# Patient Record
Sex: MALE | Race: WHITE | Employment: FULL TIME | URBAN - METROPOLITAN AREA
[De-identification: names, ages, dates, MRNs, and addresses within clinical notes are randomized per-mention and may not be internally consistent; named-entity substitution may affect disease eponyms.]

---

## 2017-06-09 ENCOUNTER — OFFICE VISIT (OUTPATIENT)
Dept: ENDOCRINOLOGY | Age: 32
End: 2017-06-09

## 2017-06-09 VITALS
WEIGHT: 289.2 LBS | HEIGHT: 74 IN | HEART RATE: 68 BPM | DIASTOLIC BLOOD PRESSURE: 60 MMHG | SYSTOLIC BLOOD PRESSURE: 123 MMHG | BODY MASS INDEX: 37.12 KG/M2

## 2017-06-09 DIAGNOSIS — E29.1 HYPOGONADISM IN MALE: ICD-10-CM

## 2017-06-09 DIAGNOSIS — E55.9 VITAMIN D DEFICIENCY: ICD-10-CM

## 2017-06-09 DIAGNOSIS — R94.6 BORDERLINE ABNORMAL THYROID FUNCTION TEST: ICD-10-CM

## 2017-06-09 DIAGNOSIS — E78.1 HIGH TRIGLYCERIDES: ICD-10-CM

## 2017-06-09 DIAGNOSIS — R73.01 IMPAIRED FASTING GLUCOSE: Primary | ICD-10-CM

## 2017-06-09 RX ORDER — AMOXICILLIN AND CLAVULANATE POTASSIUM 500; 125 MG/1; MG/1
1 TABLET, FILM COATED ORAL EVERY 12 HOURS
COMMUNITY
Start: 2017-06-06 | End: 2017-06-23

## 2017-06-09 RX ORDER — OMEPRAZOLE 20 MG/1
20 CAPSULE, DELAYED RELEASE ORAL DAILY
COMMUNITY

## 2017-06-09 RX ORDER — ERGOCALCIFEROL 1.25 MG/1
CAPSULE ORAL
Refills: 5 | COMMUNITY
Start: 2017-05-12 | End: 2017-06-22 | Stop reason: SDUPTHER

## 2017-06-09 NOTE — MR AVS SNAPSHOT
Visit Information Date & Time Provider Department Dept. Phone Encounter #  
 6/9/2017  8:50 AM Reggie Triplett, [address] [place] [address] Diabetes and Endocrinology  Follow-up Instructions Return in about 1 year (around 6/9/2018). Upcoming Health Maintenance Date Due DTaP/Tdap/Td series (1 - Tdap) 7/22/2007 INFLUENZA AGE 9 TO ADULT 8/1/2017 Allergies as of 6/9/2017  Review Complete On: 6/9/2017 By: Reggie Triplett MD  
 No Known Allergies Current Immunizations  Never Reviewed No immunizations on file. Not reviewed this visit You Were Diagnosed With   
  
 Codes Comments Impaired fasting glucose    -  Primary ICD-10-CM: R73.01 
ICD-9-CM: 790.21 Hypogonadism in male     ICD-10-CM: E29.1 ICD-9-CM: 257.2 Vitamin D deficiency     ICD-10-CM: E55.9 ICD-9-CM: 268.9 High triglycerides     ICD-10-CM: E78.1 ICD-9-CM: 272.1 Borderline abnormal thyroid function test     ICD-10-CM: R94.6 ICD-9-CM: 794.5 Vitals BP Pulse Height(growth percentile) Weight(growth percentile) BMI Smoking Status 123/60 (BP 1 Location: Right arm) 68 6' 2\" (1.88 m) 289 lb 3.2 oz (131.2 kg) 37.13 kg/m2 Former Smoker Vitals History BMI and BSA Data Body Mass Index Body Surface Area  
 37.13 kg/m 2 2.62 m 2 Preferred Pharmacy Pharmacy Name Phone [id number] [place] [phone] Your Updated Medication List  
  
   
This list is accurate as of: 6/9/17  9:50 AM.  Always use your most recent med list.  
  
  
  
  
 amoxicillin-clavulanate 500-125 mg per tablet Commonly known as:  AUGMENTIN Take 1 Tab by mouth every twelve (12) hours. omeprazole 20 mg capsule Commonly known as:  PRILOSEC Take 20 mg by mouth daily. VITAMIN D2 50,000 unit capsule Generic drug:  ergocalciferol TAKE ONE CAPSULE BY MOUTH EVERY WEEK We Performed the Following HEMOGLOBIN A1C WITH EAG [10107 CPT(R)] LIPID PANEL [12317 CPT(R)] METABOLIC PANEL, COMPREHENSIVE [74529 CPT(R)] TN COLLECTION VENOUS BLOOD,VENIPUNCTURE [id number] CPT(R)] TN HANDLG&/OR CONVEY OF SPEC FOR TR OFFICE TO LAB [48559 CPT(R)] PROLACTIN [85178 CPT(R)] T4, FREE [id number] CPT(R)] TESTOSTERONE, TOTAL, ADULT MALE [69296 CPT(R)] THYROID PEROXIDASE (TPO) AB [02214 CPT(R)] TSH 3RD GENERATION [05777 CPT(R)] VITAMIN D, 25 HYDROXY [id number] CPT(R)] Follow-up Instructions Return in about 1 year (around 6/9/2018). Patient Instructions 1) You have lost 17 lbs since Oct 2016. Keep up the good work. 2) We will repeat your testosterone and your Hemoglobin A1c (3 month test of blood sugar) and cholesterol and thyroid and vitamin D levels to see the effects of weight loss on your labs. 3) I will send you a message through [employer] with your lab results but if you don't sign up, we'll call you or send you a letter with the results. Introducing [facility]! [employer] introduces [employer] patient portal. Now you can access parts of your medical record, email your doctor's office, and request medication refills online. 1. In your internet browser, go to [email or web address] 2. Click on the First Time User? Click Here link in the Sign In box. You will see the New Member Sign Up page. 3. Enter your [employer] Access Code exactly as it appears below. You will not need to use this code after youve completed the sign-up process. If you do not sign up before the expiration date, you must request a new code. · [employer] Access Code: [email or web address] Expires: 9/7/2017  9:50 AM 
 
4. Enter the last four digits of your Social Security Number (xxxx) and Date of Birth (mm/dd/yyyy) as indicated and click Submit. You will be taken to the next sign-up page. 5. Create a MyChart ID.  This will be your [employer] login ID and cannot be changed, so think of one that is secure and easy to remember. 6. Create a [employer] password. You can change your password at any time. 7. Enter your Password Reset Question and Answer. This can be used at a later time if you forget your password. 8. Enter your e-mail address. You will receive e-mail notification when new information is available in [address] 9. Click Sign Up. You can now view and download portions of your medical record. 10. Click the Download Summary menu link to download a portable copy of your medical information. If you have questions, please visit the Frequently Asked Questions section of the [email or web address] website. Remember, [employer] is NOT to be used for urgent needs. For medical emergencies, dial 911. Now available from your iPhone and Android! Please provide this summary of care documentation to your next provider. Your primary care clinician is listed as Ritika Kenny. If you have any questions after today's visit, please call [phone].

## 2017-06-09 NOTE — PROGRESS NOTES
Chief Complaint   Patient presents with    Other     metabolic syndrome    Other     pcp and pharmacy confirmed     History of Present Illness: Latasha Bishop (goes by Lito Mosley) is a 28 y.o. male who is a new patient for evaluation of metabolic syndrome. He went for a physical in Oct 2016 and was found to have a fasting sugar of 126 and an A1c of 5.8%. No FH of DM that he knows of. A typical day is as follows:  - breakfast: tends to skip but may have a sausage biscuit on the go  - lunch: tends to grab a burger and fang on the go  - dinner: tends to also eat out, has cut way back on candy since 10/16  - beverages: may have a 16-20 oz bottle of reg soda 2-3 times a week, rarely may have 1/2 tea 1/2 lemonade  - snacks: none  Exercise consists of walking on the job but no other dedicated exercise. No history of vascular disease. No history of retinopathy, neuropathy, or nephropathy. Last eye exam was 5-6 years ago. As part of the evaluation in 10/16 was also found to have a low testosterone of 140 with a low free T of 6.3 and a normal LH/FSH of 6.8/3.9. Has noticed a decrease in sex drive but not in function. No breast tenderness or decreased facial or body hair growth. Some fatigue. No muscle weakness. Has a TSH that was slightly high at 4.58 in 10/17. Did have a bout of constipation a few weeks ago but has been better recently. No dry skin. No cold intolerance. Also was found to have elevated TGs of 405 and low HDL of 32. Does drink beer during the week as listed in social history. Will be moving to [place] at the end of the month but is willing to come back once a year to be seen. Currently taking vitamin D 50K units weekly for a level of 20.2 in 10/16.     Past Medical History:   Diagnosis Date    Borderline abnormal thyroid function test     TSH 4.56 in 10/17    GERD (gastroesophageal reflux disease)     Gout     affected both feet    High triglycerides     Hypogonadism in male    Willem Becerril Impaired fasting glucose     Vitamin D deficiency      Past Surgical History:   Procedure Laterality Date    HX WISDOM TEETH EXTRACTION       Current Outpatient Prescriptions   Medication Sig    amoxicillin-clavulanate (AUGMENTIN) 500-125 mg per tablet Take 1 Tab by mouth every twelve (12) hours.  VITAMIN D2 50,000 unit capsule TAKE ONE CAPSULE BY MOUTH EVERY WEEK    omeprazole (PRILOSEC) 20 mg capsule Take 20 mg by mouth daily. No current facility-administered medications for this visit. No Known Allergies     Family History   Problem Relation Age of Onset    No Known Problems Mother     Hypertension Father     Hypertension Paternal Grandmother     Stroke Paternal Grandmother      affecting her vision    Diabetes Neg Hx      Social History     Social History    Marital status: UNKNOWN     Spouse name: N/A    Number of children: N/A    Years of education: N/A     Occupational History    Not on file. Social History Main Topics    Smoking status: Former Smoker     Quit date: 6/9/2006    Smokeless tobacco: Not on file      Comment: only smoked 2-3 cigs/day    Alcohol use Yes      Comment: may have 6 beers 2-3 times a week    Drug use: No    Sexual activity: Not on file     Other Topics Concern    Not on file     Social History Narrative    Lives in [facility] with his grandmother. Will be moving out soon down to [place]. He is engaged and will be getting  in Jan 2018. Works inspecting fire systems.        Review of Systems:  - Constitutional Symptoms: no fevers, chills, (+) 17 lb weight loss since 10/16  - Eyes: no blurry vision or double vision  - Cardiovascular: no chest pain or palpitations  - Respiratory: some cough and is currently on augmentin for this, no shortness of breath  - Gastrointestinal: no dysphagia or abdominal pain  - Musculoskeletal: no joint pains or weakness  - Integumentary: no rashes  - Neurological: no numbness, tingling, or headaches  - Psychiatric: no depression, some anxiety regarding upcoming wedding  - Endocrine: mild heat intolerance, no polyuria or polydipsia    Physical Examination:  Blood pressure 123/60, pulse 68, height 6' 2\" (1.88 m), weight 289 lb 3.2 oz (131.2 kg). - General: pleasant, no distress, good eye contact  - HEENT: no exopthalmos, no periorbital edema, no scleral/conjunctival injection, EOMI, no lid lag or stare  - Neck: supple, no thyromegaly, masses, lymph nodes, or carotid bruits, no supraclavicular or dorsocervical fat pads  - Cardiovascular: regular, normal rate, normal S1 and S2, no murmurs/rubs/gallops,  - Respiratory: clear to auscultation bilaterally  - Gastrointestinal: soft, nontender, nondistended, no masses, no hepatosplenomegaly  - Musculoskeletal: no proximal muscle weakness in upper or lower extremities  - Integumentary: no acanthosis nigricans, no abdominal striae, no rashes, no edema,   - Neurological: reflexes 2+, no tremors  - Psychiatric: normal mood and affect        Data Reviewed: 10/28/16  - Hgb A1c 5.8%  - lipids: total 188 ,  HDL 32, , LDL can't be calculated  - ALT 40, AST 27  - BUN/Cr 15/1.18  - TSH 4.58  - free T4 1.15  - vitamin D 20.2  - total testosterone 140  - free testosterone 6.3  - LH/FSH 6.8/3.9    Assessment/Plan:   1. Impaired fasting glucose: his most recent Hgb A1c was 5.8% and fasting sugar was 126 in 10/16. Has lost 17 lbs since then so hopefully his levels have improved. Still needs to work on diet and beer intake  - check A1c and cmp today and prior to next visit      2. Hypogonadism in male: was found to have a low testosterone of 140 with a low free T of 6.3 and a normal LH/FSH of 6.8/3.9 in 10/16. Likely due to obesity but will rule out high prolactin. - check prolactin and total testosterone level today     3.  Vitamin D deficiency: level was 20.2 in 10/16 and taking weekly vitamin D since then  - check Vitamin D 25-OH level today and prior to next visit  - cont ergo 50K units weekly      4. High triglycerides:  and HDL 32 in 10/16. Weight loss should help. - check lipids today and prior to next visit     5. Borderline abnormal thyroid function test: TSH was 4.56 and free T4 1.15 in 10/16. Will repeat and screen for hashimoto's to see if treatment is needed. - check TSH, free T4 and TPO ab today  - check TSH and free T4 prior to next visit       Patient Instructions   1) You have lost 17 lbs since Oct 2016. Keep up the good work. 2) We will repeat your testosterone and your Hemoglobin A1c (3 month test of blood sugar) and cholesterol and thyroid and vitamin D levels to see the effects of weight loss on your labs. 3) I will send you a message through [employer] with your lab results but if you don't sign up, we'll call you or send you a letter with the results. Follow-up Disposition:  Return in about 1 year (around 6/9/2018). Copy sent to:  Chi French NP    Lab follow up: 6/22/17    Sent him the following message in a letter and called him with his lab results:    Resulted Orders   TESTOSTERONE, TOTAL, ADULT MALE   Result Value Ref Range    Testosterone 149 (L) 348 - 1197 ng/dL      Comment:      **Effective July 17, 2017 the reference interval for**    Testosterone, Serum Males >25years old will be    changing to: Adult Males:      36 - 65      COMMENT Comment       Comment:      Adult male reference interval is based on a population of lean males  up to 36years old.       Narrative    Performed at:  [address]  [id number]  : Liz Donald MD, Phone:  [phone]   HEMOGLOBIN A1C WITH EAG   Result Value Ref Range    Hemoglobin A1c 5.5 4.8 - 5.6 %      Comment:               Pre-diabetes: 5.7 - 6.4           Diabetes: >6.4           Glycemic control for adults with diabetes: <7.0      Estimated average glucose 111 mg/dL    Narrative    Performed at:  [facility] [place], [place]  [id number]  : Jacqueline Campos MD, Phone:  [phone]   LIPID PANEL   Result Value Ref Range    Cholesterol, total 162 100 - 199 mg/dL    Triglyceride 433 (H) 0 - 149 mg/dL    HDL Cholesterol 25 (L) >39 mg/dL    VLDL, calculated Comment 5 - 40 mg/dL      Comment:      The calculation for the VLDL cholesterol is not valid when  triglyceride level is >400 mg/dL. LDL, calculated Comment 0 - 99 mg/dL      Comment:      Triglyceride result indicated is too high for an accurate LDL  cholesterol estimation. Narrative    Performed at:  [address]  [id number]  : Jacqueline Campos MD, Phone:  [phone]   TSH 3RD GENERATION   Result Value Ref Range    TSH 4.180 0.450 - 4.500 uIU/mL    Narrative    Performed at:  [address]  [id number]  : Jacqueline Campos MD, Phone:  [phone]   T4, FREE   Result Value Ref Range    T4, Free 1.07 0.82 - 1.77 ng/dL    Narrative    Performed at:  [address]  [id number]  : Jacqueline Campos MD, Phone:  [phone]   THYROID PEROXIDASE (TPO) AB   Result Value Ref Range    Thyroid peroxidase Ab 10 0 - 34 IU/mL    Narrative    Performed at:  [address]  [id number]  : Jacqueline Campos MD, Phone:  [phone]   VITAMIN D, 25 HYDROXY   Result Value Ref Range    VITAMIN D, 25-HYDROXY 28.1 (L) 30.0 - 100.0 ng/mL      Comment:      Vitamin D deficiency has been defined by the [place] of  Medicine and an Endocrine Society practice guideline as a  level of serum 25-OH vitamin D less than 20 ng/mL (1,2). The Endocrine Society went on to further define vitamin D  insufficiency as a level between 21 and 29 ng/mL (2). 1. IOM ([place] of Medicine). 2010. Dietary reference     intakes for calcium and D. [facility]:  The     [employer].  2. Cristina MF, Diony NC, Sindi SHAHID, et al.     Evaluation, treatment, and prevention of vitamin D     deficiency: an Endocrine Society clinical practice     guideline. JCEM. 2011 Jul; 96(7):1911-30. Narrative    Performed at:  [address]  [id number]  : Vilma Avery MD, Phone:  [phone]   PROLACTIN   Result Value Ref Range    Prolactin 16.5 (H) 4.0 - 15.2 ng/mL    Narrative    Performed at:  [address]  [id number]  : Vilma Avery MD, Phone:  [phone]   CVD REPORT   Result Value Ref Range    INTERPRETATION Note       Comment:      Medical Director's Note: A CV Risk Assessment Report was not  sent because both LDL cholesterol and non-HDL cholesterol  results are required to generate a report. A [employer] interpretive report has not been generated  since ordered tests are not currently relevant to the  program.      PDF IMAGE Not applicable     Narrative    Performed at:  [address] [place] [address] A  [address] [place] [address]  [id number]  : Oneida Quezada PhD, Phone:  [phone]       Per our phone conversation:    1) Hemoglobin A1c is a 3 month marker of your blood sugar control. Normal is less than 5.7% and diabetes is greater than 6.4%. Your Hemoglobin A1c is 5.5% which means your blood sugar is now in the normal non-diabetic range and under better control than last check when your value was 5.8%. Continue to work on your diet and exercise to keep your blood sugar in the normal range. 2) Total Cholesterol is the total number of cholesterol particles in your blood. Goal is less than 200. Your value is at goal.    Triglycerides are the short term fats in your blood. Goal is less than 150. Your value is above goal.    HDL is the good cholesterol in your blood. Goal is more than 40.   Your value is below goal.    LDL is the bad cholesterol in your blood. Goal is less than 100. Your value is unable to be calculated due to the high triglyceride level. Continue to follow a low cholesterol diet. Try to limit the amount of fried foods, fatty foods, butter, gravy, red meat, ice cream, cheese, and eggs in your diet, which are all high in cholesterol. 3) Your testosterone is still low at 149 and only up slightly from 140 in 10/16. Your prolactin level is slightly elevated at 15.9 so this may be causing your testosterone level to be low as high prolactin can prevent your pituitary gland (master hormone gland in the brain) to send the signal to your testicles to make testosterone. However, your TSH (thyroid test) is also towards the higher part of normal at 4.1 and sometimes when your TSH is high it can mean your metabolism is too slow (hypothyroidism) and this can cause your prolactin level to rise. Therefore, I think the best plan is to do a trial of thyroid hormone called levothyroxine and take one tablet daily of 137 mcg. Take Levothyroxine either in the morning with just water, at least 30 minutes before breakfast and coffee and all other pills, or take it at bedtime on any empty stomach 2-3 hours after dinner. This must be  from vitamins, calcium, or iron by at least 4 hours. [facility]  today. 4) Your vitamin D level is 28 which is slightly low but up from 20 in 10/16. Goal is over 30. Please continue to take 50,000 units of vitamin D weekly to keep your levels at goal. I [facility] refills [facility].  If you miss a dose of vitamin D, it's okay to take it as soon as you remember or just double up the next week. 5) I have enclosed a lab slip and ask that you go to your local [facility] and repeat your lab 6 weeks after starting the levothyroxine so we can see how your thyroid and testosterone and prolactin levels are doing. Please fast for your labs.   Don't eat anything after midnight and go to the lab between 7:30-9am and I will be in touch with the results. 6) Please sign up for mychart so you can be in touch with me once you move to [place].

## 2017-06-09 NOTE — PATIENT INSTRUCTIONS
1) You have lost 17 lbs since Oct 2016. Keep up the good work. 2) We will repeat your testosterone and your Hemoglobin A1c (3 month test of blood sugar) and cholesterol and thyroid and vitamin D levels to see the effects of weight loss on your labs. 3) I will send you a message through [employer] with your lab results but if you don't sign up, we'll call you or send you a letter with the results.

## 2017-06-10 LAB
25(OH)D3+25(OH)D2 SERPL-MCNC: 28.1 NG/ML (ref 30–100)
CHOLEST SERPL-MCNC: 162 MG/DL (ref 100–199)
COMMENT: ABNORMAL
EST. AVERAGE GLUCOSE BLD GHB EST-MCNC: 111 MG/DL
HBA1C MFR BLD: 5.5 % (ref 4.8–5.6)
HDLC SERPL-MCNC: 25 MG/DL
INTERPRETATION, 910389: NORMAL
LDLC SERPL CALC-MCNC: ABNORMAL MG/DL (ref 0–99)
PDF IMAGE, 910387: NORMAL
PROLACTIN SERPL-MCNC: 16.5 NG/ML (ref 4–15.2)
T4 FREE SERPL-MCNC: 1.07 NG/DL (ref 0.82–1.77)
TESTOST SERPL-MCNC: 149 NG/DL (ref 348–1197)
THYROPEROXIDASE AB SERPL-ACNC: 10 IU/ML (ref 0–34)
TRIGL SERPL-MCNC: 433 MG/DL (ref 0–149)
TSH SERPL DL<=0.005 MIU/L-ACNC: 4.18 UIU/ML (ref 0.45–4.5)
VLDLC SERPL CALC-MCNC: ABNORMAL MG/DL (ref 5–40)

## 2017-06-22 RX ORDER — LEVOTHYROXINE SODIUM 137 UG/1
137 TABLET ORAL
Qty: 30 TAB | Refills: 11 | Status: SHIPPED | OUTPATIENT
Start: 2017-06-22

## 2017-06-22 RX ORDER — ERGOCALCIFEROL 1.25 MG/1
CAPSULE ORAL
Qty: 4 CAP | Refills: 11 | Status: SHIPPED | OUTPATIENT
Start: 2017-06-22

## 2020-08-26 PROBLEM — U07.1 PNEUMONIA DUE TO COVID-19 VIRUS: Status: ACTIVE | Noted: 2020-08-26

## 2020-08-26 PROBLEM — J12.82 PNEUMONIA DUE TO COVID-19 VIRUS: Status: ACTIVE | Noted: 2020-08-26

## 2021-09-25 PROBLEM — M10.9 ACUTE GOUT OF LEFT FOOT: Status: ACTIVE | Noted: 2021-09-25

## 2021-09-25 PROBLEM — L03.116 CELLULITIS OF LEFT LOWER EXTREMITY: Status: ACTIVE | Noted: 2021-09-25

## 2021-09-25 PROBLEM — Z86.14 HISTORY OF MRSA INFECTION: Status: ACTIVE | Noted: 2021-09-25

## 2022-03-18 PROBLEM — J12.82 PNEUMONIA DUE TO COVID-19 VIRUS: Status: ACTIVE | Noted: 2020-08-26

## 2022-03-18 PROBLEM — U07.1 PNEUMONIA DUE TO COVID-19 VIRUS: Status: ACTIVE | Noted: 2020-08-26

## 2022-03-18 PROBLEM — M10.9 ACUTE GOUT OF LEFT FOOT: Status: ACTIVE | Noted: 2021-09-25

## 2022-03-19 PROBLEM — L03.116 CELLULITIS OF LEFT LOWER EXTREMITY: Status: ACTIVE | Noted: 2021-09-25

## 2022-03-19 PROBLEM — Z86.14 HISTORY OF MRSA INFECTION: Status: ACTIVE | Noted: 2021-09-25

## 2022-07-18 PROBLEM — M77.8 EXTENSOR TENDINITIS OF FOOT: Status: ACTIVE | Noted: 2022-07-18

## 2022-07-18 PROBLEM — M67.90 TENDINOPATHY: Status: ACTIVE | Noted: 2022-07-18

## 2023-01-31 RX ORDER — GUAIFENESIN 200 MG/10ML
100 LIQUID ORAL 3 TIMES DAILY PRN
COMMUNITY
Start: 2020-08-28

## 2023-01-31 RX ORDER — OMEPRAZOLE 20 MG/1
20 CAPSULE, DELAYED RELEASE ORAL DAILY
COMMUNITY

## 2023-01-31 RX ORDER — LEVOTHYROXINE SODIUM 137 UG/1
137 TABLET ORAL
COMMUNITY
Start: 2017-06-22

## 2023-01-31 RX ORDER — ERGOCALCIFEROL 1.25 MG/1
CAPSULE ORAL
COMMUNITY
Start: 2017-06-22

## 2023-11-30 ENCOUNTER — HOSPITAL ENCOUNTER (EMERGENCY)
Facility: HOSPITAL | Age: 38
Discharge: HOME | End: 2023-11-30
Attending: EMERGENCY MEDICINE
Payer: COMMERCIAL

## 2023-11-30 VITALS
RESPIRATION RATE: 20 BRPM | DIASTOLIC BLOOD PRESSURE: 96 MMHG | HEART RATE: 93 BPM | TEMPERATURE: 97.7 F | SYSTOLIC BLOOD PRESSURE: 174 MMHG | WEIGHT: 285 LBS | BODY MASS INDEX: 37.77 KG/M2 | OXYGEN SATURATION: 96 % | HEIGHT: 73 IN

## 2023-11-30 DIAGNOSIS — L94.2 CALCINOSIS CUTIS: Primary | ICD-10-CM

## 2023-11-30 DIAGNOSIS — L03.019 CELLULITIS OF LITTLE FINGER: ICD-10-CM

## 2023-11-30 PROCEDURE — 99284 EMERGENCY DEPT VISIT MOD MDM: CPT

## 2023-11-30 PROCEDURE — 96374 THER/PROPH/DIAG INJ IV PUSH: CPT

## 2023-11-30 PROCEDURE — 6360000002 HC RX W HCPCS: Performed by: EMERGENCY MEDICINE

## 2023-11-30 PROCEDURE — 2500000003 HC RX 250 WO HCPCS

## 2023-11-30 RX ORDER — COLCHICINE 0.6 MG/1
0.6 TABLET ORAL DAILY PRN
Status: ON HOLD | COMMUNITY
Start: 2023-11-09

## 2023-11-30 RX ORDER — OXYCODONE HYDROCHLORIDE AND ACETAMINOPHEN 5; 325 MG/1; MG/1
1 TABLET ORAL EVERY 6 HOURS PRN
Qty: 12 TABLET | Refills: 0 | Status: ON HOLD | OUTPATIENT
Start: 2023-11-30 | End: 2023-12-03

## 2023-11-30 RX ORDER — DOXYCYCLINE HYCLATE 100 MG
100 TABLET ORAL 2 TIMES DAILY
Qty: 14 TABLET | Refills: 0 | Status: ON HOLD | OUTPATIENT
Start: 2023-11-30 | End: 2023-12-07

## 2023-11-30 RX ORDER — MORPHINE SULFATE 4 MG/ML
4 INJECTION, SOLUTION INTRAMUSCULAR; INTRAVENOUS
Status: COMPLETED | OUTPATIENT
Start: 2023-11-30 | End: 2023-11-30

## 2023-11-30 RX ORDER — HYDROCODONE BITARTRATE AND ACETAMINOPHEN 10; 325 MG/1; MG/1
1 TABLET ORAL EVERY 6 HOURS PRN
Qty: 9 TABLET | Refills: 0 | Status: ON HOLD | OUTPATIENT
Start: 2023-11-30 | End: 2023-12-03

## 2023-11-30 RX ORDER — OXYCODONE HYDROCHLORIDE AND ACETAMINOPHEN 5; 325 MG/1; MG/1
2 TABLET ORAL
Status: DISCONTINUED | OUTPATIENT
Start: 2023-11-30 | End: 2023-11-30

## 2023-11-30 RX ORDER — LIDOCAINE HYDROCHLORIDE 10 MG/ML
5 INJECTION, SOLUTION INFILTRATION; PERINEURAL
Status: COMPLETED | OUTPATIENT
Start: 2023-11-30 | End: 2023-11-30

## 2023-11-30 RX ORDER — LIDOCAINE HYDROCHLORIDE 10 MG/ML
INJECTION, SOLUTION INFILTRATION; PERINEURAL
Status: COMPLETED
Start: 2023-11-30 | End: 2023-11-30

## 2023-11-30 RX ADMIN — LIDOCAINE HYDROCHLORIDE 5 ML: 10 INJECTION, SOLUTION INFILTRATION; PERINEURAL at 16:26

## 2023-11-30 RX ADMIN — MORPHINE SULFATE 4 MG: 4 INJECTION, SOLUTION INTRAMUSCULAR; INTRAVENOUS at 16:46

## 2023-11-30 ASSESSMENT — PAIN DESCRIPTION - DESCRIPTORS: DESCRIPTORS: THROBBING

## 2023-11-30 ASSESSMENT — PAIN DESCRIPTION - ORIENTATION: ORIENTATION: RIGHT

## 2023-11-30 ASSESSMENT — PAIN - FUNCTIONAL ASSESSMENT: PAIN_FUNCTIONAL_ASSESSMENT: 0-10

## 2023-11-30 ASSESSMENT — PAIN SCALES - GENERAL: PAINLEVEL_OUTOF10: 10

## 2023-11-30 ASSESSMENT — PAIN DESCRIPTION - LOCATION: LOCATION: FINGER (COMMENT WHICH ONE)

## 2023-11-30 NOTE — ED PROVIDER NOTES
[facility] EMERGENCY DEPT  EMERGENCY DEPARTMENT HISTORY AND PHYSICAL EXAM      Date: 2023  Patient Name: Virgilio Ellington  MRN: [id number]  [address] [facility] 1985  Date of evaluation: 2023  Provider: Jae Macias MD   Note Started: 4:02 PM EST 23    HISTORY OF PRESENT ILLNESS     Chief Complaint   Patient presents with    Abscess       History Provided By: Patient    HPI: Virgilio Ellington is a 41 y.o. male here with excruciating pain to his right pinky finger. He had known deposits at the distal aspects of his fingers including this right pinky finger. Yesterday it started becoming more swollen and painful. He had previously been told this was gout. He has a history of gout in his feet which he takes colchicine for.     PAST MEDICAL HISTORY   Past Medical History:  Past Medical History:   Diagnosis Date    Borderline abnormal thyroid function test     TSH 4.56 in 10/17    GERD (gastroesophageal reflux disease)     Gout     affected both feet    High triglycerides     Hypogonadism in male     Impaired fasting glucose     Vitamin D deficiency        Past Surgical History:  Past Surgical History:   Procedure Laterality Date    WISDOM TOOTH EXTRACTION         Family History:  Family History   Problem Relation Age of Onset    Diabetes Neg Hx     Stroke Paternal Grandmother         affecting her vision    Hypertension Paternal Grandmother     Hypertension Father     No Known Problems Mother        Social History:  Social History     Tobacco Use    Smoking status: Former     Types: Cigarettes     Quit date: 2006     Years since quittin.4    Smokeless tobacco: Never    Tobacco comments:     Quit smoking: only smoked 2-3 cigs/day   Substance Use Topics    Alcohol use: Yes    Drug use: No       Allergies:  No Known Allergies    PCP: Johnny Colón MD    Current Meds:   Current Facility-Administered Medications   Medication Dose Route Frequency Provider Last Rate Last Admin    lidocaine 1 %

## 2023-12-02 PROBLEM — M86.9 OSTEOMYELITIS (HCC): Status: ACTIVE | Noted: 2023-12-02

## 2025-05-30 ENCOUNTER — HOSPITAL ENCOUNTER (EMERGENCY)
Age: 40
Discharge: HOME | End: 2025-05-30
Attending: EMERGENCY MEDICINE
Payer: COMMERCIAL

## 2025-05-30 VITALS
OXYGEN SATURATION: 99 % | SYSTOLIC BLOOD PRESSURE: 170 MMHG | RESPIRATION RATE: 19 BRPM | DIASTOLIC BLOOD PRESSURE: 93 MMHG | BODY MASS INDEX: 35.12 KG/M2 | WEIGHT: 265 LBS | HEIGHT: 73 IN | TEMPERATURE: 98.3 F | HEART RATE: 65 BPM

## 2025-05-30 DIAGNOSIS — R07.9 CHEST PAIN, UNSPECIFIED TYPE: Primary | ICD-10-CM

## 2025-05-30 LAB
ANION GAP SERPL CALC-SCNC: 16 MMOL/L (ref 7–16)
BASOPHILS # BLD: 0.04 K/UL (ref 0–0.1)
BASOPHILS NFR BLD: 0.8 % (ref 0–2)
BUN SERPL-MCNC: 15 MG/DL (ref 6–23)
BUN/CREAT SERPL: 16
CALCIUM SERPL-MCNC: 9.6 MG/DL (ref 8.5–10.1)
CHLORIDE SERPL-SCNC: 102 MMOL/L (ref 98–107)
CO2 SERPL-SCNC: 22 MMOL/L (ref 21–32)
CREAT SERPL-MCNC: 0.93 MG/DL (ref 0.6–1.3)
DIFFERENTIAL METHOD BLD: NORMAL
EKG ATRIAL RATE: 64 BPM
EKG DIAGNOSIS: NORMAL
EKG P AXIS: 28 DEGREES
EKG P-R INTERVAL: 132 MS
EKG Q-T INTERVAL: 404 MS
EKG QRS DURATION: 100 MS
EKG QTC CALCULATION (BAZETT): 416 MS
EKG R AXIS: 15 DEGREES
EKG T AXIS: 28 DEGREES
EKG VENTRICULAR RATE: 64 BPM
EOSINOPHIL # BLD: 0.02 K/UL (ref 0–0.4)
EOSINOPHIL NFR BLD: 0.4 % (ref 0–5)
ERYTHROCYTE [DISTWIDTH] IN BLOOD BY AUTOMATED COUNT: 12.7 % (ref 11.6–14.5)
GLUCOSE SERPL-MCNC: 104 MG/DL (ref 74–108)
HCT VFR BLD AUTO: 42.2 % (ref 36–48)
HGB BLD-MCNC: 14.9 G/DL (ref 13–16)
IMM GRANULOCYTES # BLD AUTO: 0.01 K/UL (ref 0–0.04)
IMM GRANULOCYTES NFR BLD AUTO: 0.2 % (ref 0–0.5)
LYMPHOCYTES # BLD: 1.74 K/UL (ref 0.9–3.6)
LYMPHOCYTES NFR BLD: 36.1 % (ref 21–52)
MCH RBC QN AUTO: 31.2 PG (ref 24–34)
MCHC RBC AUTO-ENTMCNC: 35.3 G/DL (ref 31–37)
MCV RBC AUTO: 88.5 FL (ref 78–100)
MONOCYTES # BLD: 0.3 K/UL (ref 0.05–1.2)
MONOCYTES NFR BLD: 6.2 % (ref 3–10)
NEUTS SEG # BLD: 2.71 K/UL (ref 1.8–8)
NEUTS SEG NFR BLD: 56.3 % (ref 40–73)
NRBC # BLD: 0 K/UL (ref 0–0.01)
NRBC BLD-RTO: 0 PER 100 WBC
PLATELET # BLD AUTO: 180 K/UL (ref 135–420)
PMV BLD AUTO: 10.4 FL (ref 9.2–11.8)
POTASSIUM SERPL-SCNC: 4.1 MMOL/L (ref 3.5–5.5)
RBC # BLD AUTO: 4.77 M/UL (ref 4.35–5.65)
SODIUM SERPL-SCNC: 140 MMOL/L (ref 136–145)
TROPONIN T SERPL HS-MCNC: <6 NG/L (ref 0–22)
WBC # BLD AUTO: 4.8 K/UL (ref 4.6–13.2)

## 2025-05-30 PROCEDURE — 85025 COMPLETE CBC W/AUTO DIFF WBC: CPT

## 2025-05-30 PROCEDURE — 99284 EMERGENCY DEPT VISIT MOD MDM: CPT

## 2025-05-30 PROCEDURE — 84484 ASSAY OF TROPONIN QUANT: CPT

## 2025-05-30 PROCEDURE — 93005 ELECTROCARDIOGRAM TRACING: CPT

## 2025-05-30 PROCEDURE — 80048 BASIC METABOLIC PNL TOTAL CA: CPT

## 2025-05-30 ASSESSMENT — PAIN - FUNCTIONAL ASSESSMENT: PAIN_FUNCTIONAL_ASSESSMENT: NONE - DENIES PAIN

## 2025-05-30 ASSESSMENT — LIFESTYLE VARIABLES
HOW OFTEN DO YOU HAVE A DRINK CONTAINING ALCOHOL: 2-3 TIMES A WEEK
HOW MANY STANDARD DRINKS CONTAINING ALCOHOL DO YOU HAVE ON A TYPICAL DAY: 1 OR 2

## 2025-05-30 NOTE — ED TRIAGE NOTES
Arrived from [facility] via [facility] for c/o intermittent \"sharp stabbing pain to the center of my chest lasting a few seconds and I felt like I was going to Black Out\". Denies pain at this time. Occurred once yesterday as well. Denies SOB.

## 2025-05-30 NOTE — ED PROVIDER NOTES
EMERGENCY DEPARTMENT HISTORY AND PHYSICAL EXAM    1:50 PM EDT seen at the time in room 4        Date: 5/30/2025  Patient Name: Frank Salinas    History of Presenting Illness     Chief Complaint   Patient presents with    Chest Pain         History Provided By: patient    Additional History (Context): Frank Salinas is a 39 y.o. male presents with no contributory medical history medications or allergies had an episode of pain around noon, just like someone it stabbed him in the chest lasted for seconds but he became lightheaded and things started to go black.  Resolved spontaneously.  Today around noon had a very similar episode severe stabbing pain lasted for just a couple of seconds and again felt like he was going to pass out and this scared him and so he wants to make sure he does not have a heart attack or a blood clot no alarming family history.    PCP: Mirtha Gómez MD    Chief Complaint:   Duration:    Timing:    Location:   Quality:   Severity:   Modifying Factors:   Associated Symptoms:       No current facility-administered medications for this encounter.     Current Outpatient Medications   Medication Sig Dispense Refill    colchicine (COLCRYS) 0.6 MG tablet Take 1 tablet by mouth daily For 2-3 months & then daily prn gout (Patient not taking: Reported on 5/30/2025) 30 tablet 5    levothyroxine (SYNTHROID) 137 MCG tablet Take 1 tablet by mouth every morning (before breakfast) (Patient not taking: Reported on 5/30/2025) 30 tablet 12    allopurinol (ZYLOPRIM) 300 MG tablet Take 1 tablet by mouth daily 30 tablet 12    predniSONE (DELTASONE) 20 MG tablet 2 tabs qd x 3 days, then 1 tab qd x 3 days, then 1/2 tab qd x 4 days (Patient not taking: Reported on 5/30/2025) 11 tablet 0    omeprazole (PRILOSEC) 20 MG delayed release capsule Take 1 capsule by mouth daily         Past History     Past Medical History:  Past Medical History:   Diagnosis Date    Borderline abnormal thyroid function test